# Patient Record
(demographics unavailable — no encounter records)

---

## 2024-10-21 NOTE — HISTORY OF PRESENT ILLNESS
[Disease:__________________________] : Disease: [unfilled] [Treatment Protocol] : Treatment Protocol [de-identified] : This is a patient previously taken care of by Dr. Holm, care was transitioned to me on 11/30/2021. \par  \par  Initially seen on 11/16/12 due to SPEP showing monoclonal gammopathy (SPEP checked due to mild anemia). He had a BM bx done on 11/30/12 showing polyclonal plasma cells less than 0.1%. Skeletal survey was normal. Creatinine/calcium normal. He was diagnosed with monoclonal gammopathy of unknown significance (MGUS) and he has been followed with SPEP/SMA q6months. \par  Started taking oral B12 after completion of series of injections for B12 deficiency with his PCP.\par   [FreeTextEntry1] : active surveillance [de-identified] : Patient seen for follow up on 10/21/2024. Reported having and event on his monitor that prompted his cardiologist to start Eliquis, on suggestion he responded "yes" to this being afib. Stopped plavix. Patient with no complaints today. Reports no febrile illnesses. No weight loss or night sweats. No chest pain, palpitations or shortness of breath. Denies any N/V/D. No pain symptoms at the present time. Normal bowel movements and normal urinary habits. Patient reports a good appetite at this time.

## 2024-10-21 NOTE — PHYSICAL EXAM
[Restricted in physically strenuous activity but ambulatory and able to carry out work of a light or sedentary nature] : Status 1- Restricted in physically strenuous activity but ambulatory and able to carry out work of a light or sedentary nature, e.g., light house work, office work [Normal] : affect appropriate [de-identified] : supple [de-identified] : (+)S1S2 RRR PPM in place [de-identified] : no edema [de-identified] : warm/dry

## 2024-10-21 NOTE — REVIEW OF SYSTEMS
[Negative] : Allergic/Immunologic [Vision Problems] : no vision problems [Dysphagia] : no dysphagia [Nosebleeds] : no nosebleeds [Odynophagia] : no odynophagia [Confused] : no confusion [Dizziness] : no dizziness [Fainting] : no fainting [Difficulty Walking] : no difficulty walking [Anxiety] : no anxiety [Hot Flashes] : no hot flashes [de-identified] : chronic neuropathy of L foot

## 2024-10-21 NOTE — ASSESSMENT
[FreeTextEntry1] : 82 y/o M with IgG lambda MGUS  -Reviewed CBC today - completely within normal limits.  -Low risk disease based on clinical and lab review. -Patient has been on surveillance with clinical and lab evaluation q 6 months to this point. -No signs or symptoms of disease progression. -Repeat SPEP today - discussed with patient in detail that given the overall stability of his numbers for years, this does not need to be followed any more often than annually now. He agrees.  -All pt's concerns and questions were addressed w/ pt in detail to his apparent satisfaction and agreement -RTC Annually